# Patient Record
Sex: MALE | Race: WHITE | Employment: UNEMPLOYED | URBAN - METROPOLITAN AREA
[De-identification: names, ages, dates, MRNs, and addresses within clinical notes are randomized per-mention and may not be internally consistent; named-entity substitution may affect disease eponyms.]

---

## 2017-09-11 ENCOUNTER — APPOINTMENT (OUTPATIENT)
Dept: CT IMAGING | Age: 22
End: 2017-09-11
Attending: EMERGENCY MEDICINE
Payer: SELF-PAY

## 2017-09-11 ENCOUNTER — HOSPITAL ENCOUNTER (EMERGENCY)
Age: 22
Discharge: HOME | End: 2017-09-11
Attending: EMERGENCY MEDICINE
Payer: SELF-PAY

## 2017-09-11 VITALS
OXYGEN SATURATION: 98 % | TEMPERATURE: 98 F | DIASTOLIC BLOOD PRESSURE: 78 MMHG | HEART RATE: 84 BPM | BODY MASS INDEX: 26.48 KG/M2 | RESPIRATION RATE: 16 BRPM | HEIGHT: 70 IN | WEIGHT: 185 LBS | SYSTOLIC BLOOD PRESSURE: 145 MMHG

## 2017-09-11 DIAGNOSIS — R10.31 RLQ ABDOMINAL PAIN: Primary | ICD-10-CM

## 2017-09-11 LAB
ALBUMIN SERPL-MCNC: 4.1 G/DL (ref 3.5–5)
ALBUMIN/GLOB SERPL: 1.2 {RATIO} (ref 1.1–2.2)
ALP SERPL-CCNC: 82 U/L (ref 45–117)
ALT SERPL-CCNC: 33 U/L (ref 12–78)
ANION GAP SERPL CALC-SCNC: 8 MMOL/L (ref 5–15)
AST SERPL-CCNC: 30 U/L (ref 15–37)
BASOPHILS # BLD: 0 K/UL (ref 0–0.1)
BASOPHILS NFR BLD: 0 % (ref 0–1)
BILIRUB SERPL-MCNC: 1.1 MG/DL (ref 0.2–1)
BUN SERPL-MCNC: 12 MG/DL (ref 6–20)
BUN/CREAT SERPL: 12 (ref 12–20)
CALCIUM SERPL-MCNC: 8.6 MG/DL (ref 8.5–10.1)
CHLORIDE SERPL-SCNC: 105 MMOL/L (ref 97–108)
CO2 SERPL-SCNC: 27 MMOL/L (ref 21–32)
CREAT SERPL-MCNC: 1.02 MG/DL (ref 0.7–1.3)
EOSINOPHIL # BLD: 0.3 K/UL (ref 0–0.4)
EOSINOPHIL NFR BLD: 3 % (ref 0–7)
ERYTHROCYTE [DISTWIDTH] IN BLOOD BY AUTOMATED COUNT: 12.2 % (ref 11.5–14.5)
GLOBULIN SER CALC-MCNC: 3.3 G/DL (ref 2–4)
GLUCOSE SERPL-MCNC: 95 MG/DL (ref 65–100)
HCT VFR BLD AUTO: 44.9 % (ref 36.6–50.3)
HGB BLD-MCNC: 16.3 G/DL (ref 12.1–17)
LYMPHOCYTES # BLD: 2.8 K/UL (ref 0.8–3.5)
LYMPHOCYTES NFR BLD: 32 % (ref 12–49)
MCH RBC QN AUTO: 32.8 PG (ref 26–34)
MCHC RBC AUTO-ENTMCNC: 36.3 G/DL (ref 30–36.5)
MCV RBC AUTO: 90.3 FL (ref 80–99)
MONOCYTES # BLD: 0.8 K/UL (ref 0–1)
MONOCYTES NFR BLD: 9 % (ref 5–13)
NEUTS SEG # BLD: 4.8 K/UL (ref 1.8–8)
NEUTS SEG NFR BLD: 56 % (ref 32–75)
PLATELET # BLD AUTO: 171 K/UL (ref 150–400)
POTASSIUM SERPL-SCNC: 3.6 MMOL/L (ref 3.5–5.1)
PROT SERPL-MCNC: 7.4 G/DL (ref 6.4–8.2)
RBC # BLD AUTO: 4.97 M/UL (ref 4.1–5.7)
SODIUM SERPL-SCNC: 140 MMOL/L (ref 136–145)
WBC # BLD AUTO: 8.7 K/UL (ref 4.1–11.1)

## 2017-09-11 PROCEDURE — 74011250637 HC RX REV CODE- 250/637: Performed by: EMERGENCY MEDICINE

## 2017-09-11 PROCEDURE — 80053 COMPREHEN METABOLIC PANEL: CPT | Performed by: EMERGENCY MEDICINE

## 2017-09-11 PROCEDURE — 85025 COMPLETE CBC W/AUTO DIFF WBC: CPT | Performed by: EMERGENCY MEDICINE

## 2017-09-11 PROCEDURE — 36415 COLL VENOUS BLD VENIPUNCTURE: CPT | Performed by: EMERGENCY MEDICINE

## 2017-09-11 PROCEDURE — 99283 EMERGENCY DEPT VISIT LOW MDM: CPT

## 2017-09-11 PROCEDURE — 74177 CT ABD & PELVIS W/CONTRAST: CPT

## 2017-09-11 PROCEDURE — 96374 THER/PROPH/DIAG INJ IV PUSH: CPT

## 2017-09-11 PROCEDURE — 96361 HYDRATE IV INFUSION ADD-ON: CPT

## 2017-09-11 PROCEDURE — 74011250636 HC RX REV CODE- 250/636: Performed by: EMERGENCY MEDICINE

## 2017-09-11 PROCEDURE — 74011636320 HC RX REV CODE- 636/320: Performed by: EMERGENCY MEDICINE

## 2017-09-11 PROCEDURE — 74011000258 HC RX REV CODE- 258: Performed by: EMERGENCY MEDICINE

## 2017-09-11 RX ORDER — MORPHINE SULFATE 2 MG/ML
6 INJECTION, SOLUTION INTRAMUSCULAR; INTRAVENOUS
Status: COMPLETED | OUTPATIENT
Start: 2017-09-11 | End: 2017-09-11

## 2017-09-11 RX ORDER — SODIUM CHLORIDE 0.9 % (FLUSH) 0.9 %
10 SYRINGE (ML) INJECTION
Status: COMPLETED | OUTPATIENT
Start: 2017-09-11 | End: 2017-09-11

## 2017-09-11 RX ORDER — OXYCODONE AND ACETAMINOPHEN 5; 325 MG/1; MG/1
1 TABLET ORAL
Status: COMPLETED | OUTPATIENT
Start: 2017-09-11 | End: 2017-09-11

## 2017-09-11 RX ORDER — OXYCODONE AND ACETAMINOPHEN 5; 325 MG/1; MG/1
1 TABLET ORAL
Qty: 15 TAB | Refills: 0 | Status: SHIPPED | OUTPATIENT
Start: 2017-09-11

## 2017-09-11 RX ADMIN — OXYCODONE HYDROCHLORIDE AND ACETAMINOPHEN 1 TABLET: 5; 325 TABLET ORAL at 23:03

## 2017-09-11 RX ADMIN — SODIUM CHLORIDE 100 ML: 900 INJECTION, SOLUTION INTRAVENOUS at 21:04

## 2017-09-11 RX ADMIN — Medication 10 ML: at 21:04

## 2017-09-11 RX ADMIN — SODIUM CHLORIDE 1000 ML: 900 INJECTION, SOLUTION INTRAVENOUS at 20:40

## 2017-09-11 RX ADMIN — MORPHINE SULFATE 6 MG: 2 INJECTION, SOLUTION INTRAMUSCULAR; INTRAVENOUS at 20:41

## 2017-09-11 RX ADMIN — IOPAMIDOL 100 ML: 755 INJECTION, SOLUTION INTRAVENOUS at 21:04

## 2017-09-11 NOTE — ED TRIAGE NOTES
1 week ago started to have pain when he was coughing. The pain got worse and two days ago he went to [facility]. He was told they didn't find anything with the scan but was called yesterday and told he has a \"tear\"in his abdomen and if his pain got worse he should be seen again.

## 2017-09-12 NOTE — DISCHARGE INSTRUCTIONS

## 2017-09-12 NOTE — ED PROVIDER NOTES
HPI Comments: 19 y.o. male with no significant past medical history who presents from home with chief complaint of abd pain. Per pt, he experienced onset of pain to his RLQ while he was coughing. He reports that the pain appeared to be a stabbing like pain which woke him from his sleep at the time. Since onset, the pt notes that his RLQ pain has increasingly worsened. Per pt, he was seen at [facility] two days ago (9/9/2017) where he received a CT scan and informed that there were no significant findings. The pt reports that his abd pain has worsened over the past two days and he has not been taking any pain medication to treat it. He rates his current pain 10/10. Per pt, he received a call yesterday (9/11/2017) regarding his CT scan from two days ago and was instructed to be seen in the ED if his pain worsened as there was evidence of a \"tear\" to his abd. The pt denies fever, chills, N/V/D, CP, SOB, dizziness, headache and urinary symptoms. There are no other acute medical concerns at this time. Social hx: Current daily smoker, No ETOH use  PCP: None    Note written by Lynne Hines, as dictated by Briseida Lara MD 8:19 PM          The history is provided by the patient. History reviewed. No pertinent past medical history. History reviewed. No pertinent surgical history. No family history on file. Social History     Social History    Marital status: SINGLE     Spouse name: N/A    Number of children: N/A    Years of education: N/A     Occupational History    Not on file. Social History Main Topics    Smoking status: Current Every Day Smoker    Smokeless tobacco: Not on file    Alcohol use No    Drug use: Not on file    Sexual activity: Not on file     Other Topics Concern    Not on file     Social History Narrative    No narrative on file         ALLERGIES: Penicillins    Review of Systems   Constitutional: Negative. Negative for chills and fever. HENT: Negative. Negative for rhinorrhea and sore throat. Eyes: Negative. Respiratory: Positive for cough (intermittent cough for the past week). Negative for shortness of breath. Cardiovascular: Negative. Negative for chest pain. Gastrointestinal: Positive for abdominal pain ( increasing RLQ pain since onset 1x week ago. Pain worse with movement or when coughing. ). Negative for diarrhea, nausea and vomiting. Endocrine: Negative. Genitourinary: Negative. Negative for dysuria and hematuria. Musculoskeletal: Negative. Negative for arthralgias and myalgias. Skin: Negative. Negative for pallor and rash. Allergic/Immunologic: Negative. Neurological: Negative. Negative for dizziness, weakness and light-headedness. Hematological: Negative. Psychiatric/Behavioral: Negative. All other systems reviewed and are negative. Vitals:    09/11/17 1943   BP: 151/84   Pulse: 94   Resp: 20   Temp: 97.8 °F (36.6 °C)   SpO2: 97%   Weight: 83.9 kg (185 lb)   Height: 5' 10\" (1.778 m)            Physical Exam   Vital signs reviewed. Nursing notes reviewed.     Const:  No acute distress, well developed, well nourished  Head:  Atraumatic, normocephalic  Eyes:  PERRL, conjunctiva normal, no scleral icterus  Neck:  Supple, trachea midline  Cardiovascular:  RRR, no murmurs, no gallops, no rubs  Resp:  No resp distress, no increased work of breathing, no wheezes, no rhonchi, no rales,  Abd:  Soft, Moderate tenderness to RLQ with palpation, non-distended, no rebound, no guarding, no CVA tenderness  :  Deferred  MSK:  No pedal edema, normal ROM  Neuro:  Alert and oriented x3, no cranial nerve defect  Skin:  Warm, dry, intact  Psych: normal mood and affect, behavior is normal, judgement and thought content is normal    Note written by Lynne Hines, as dictated by Briseida Lara MD 8:19 PM    MDM  Number of Diagnoses or Management Options  RLQ abdominal pain:      Amount and/or Complexity of Data Reviewed  Clinical lab tests: ordered and reviewed  Tests in the radiology section of CPT®: ordered and reviewed  Review and summarize past medical records: yes    Patient Progress  Patient progress: stable    ED Course     Pt. Presents to the ER with abdominal pain. Pt. With RLQ abd pain. He was told that he had a \"tear\" on his CT. No acute findings on CT. No appendicitis. I will start him on percocet as needed for pain. Pt. To f/u with his PCP or return to the ER with worsening sx.       Procedures

## 2022-06-14 ENCOUNTER — HOSPITAL ENCOUNTER (EMERGENCY)
Age: 27
Discharge: HOME | End: 2022-06-14
Attending: EMERGENCY MEDICINE

## 2022-06-14 VITALS
WEIGHT: 214.07 LBS | TEMPERATURE: 98.1 F | BODY MASS INDEX: 30.65 KG/M2 | HEIGHT: 70 IN | HEART RATE: 92 BPM | OXYGEN SATURATION: 98 % | RESPIRATION RATE: 17 BRPM

## 2022-06-14 DIAGNOSIS — M54.50 ACUTE MIDLINE LOW BACK PAIN WITHOUT SCIATICA: Primary | ICD-10-CM

## 2022-06-14 PROCEDURE — 74011250636 HC RX REV CODE- 250/636: Performed by: EMERGENCY MEDICINE

## 2022-06-14 PROCEDURE — 99284 EMERGENCY DEPT VISIT MOD MDM: CPT

## 2022-06-14 PROCEDURE — 96372 THER/PROPH/DIAG INJ SC/IM: CPT

## 2022-06-14 PROCEDURE — 74011250637 HC RX REV CODE- 250/637: Performed by: EMERGENCY MEDICINE

## 2022-06-14 RX ORDER — NAPROXEN 500 MG/1
500 TABLET ORAL 2 TIMES DAILY WITH MEALS
Qty: 20 TABLET | Refills: 0 | Status: SHIPPED | OUTPATIENT
Start: 2022-06-14 | End: 2022-06-24

## 2022-06-14 RX ORDER — LIDOCAINE 50 MG/G
PATCH TOPICAL
Qty: 15 EACH | Refills: 0 | Status: SHIPPED | OUTPATIENT
Start: 2022-06-14

## 2022-06-14 RX ORDER — DIAZEPAM 5 MG/1
5 TABLET ORAL
Status: COMPLETED | OUTPATIENT
Start: 2022-06-15 | End: 2022-06-14

## 2022-06-14 RX ORDER — KETOROLAC TROMETHAMINE 30 MG/ML
60 INJECTION, SOLUTION INTRAMUSCULAR; INTRAVENOUS ONCE
Status: COMPLETED | OUTPATIENT
Start: 2022-06-15 | End: 2022-06-14

## 2022-06-14 RX ORDER — CYCLOBENZAPRINE HCL 10 MG
10 TABLET ORAL
Qty: 12 TABLET | Refills: 0 | Status: SHIPPED | OUTPATIENT
Start: 2022-06-14

## 2022-06-14 RX ADMIN — DIAZEPAM 5 MG: 5 TABLET ORAL at 23:23

## 2022-06-14 RX ADMIN — KETOROLAC TROMETHAMINE 60 MG: 30 INJECTION, SOLUTION INTRAMUSCULAR; INTRAVENOUS at 23:23

## 2022-06-14 NOTE — Clinical Note
Clare Craft was seen and treated in our emergency department on 6/14/2022. Please provide assistance at work until back pain improves.     Angelica Lopez MD

## 2022-06-15 NOTE — ED TRIAGE NOTES
Pt reports twisting his back while cleaning a reptile tank yesterday, now reporting lower back pain. Pt denies loss of bladder or bowel control, denies numbness or tingling in arms or legs. Taking tylenol and ibuprofen without improvement.

## 2022-06-15 NOTE — ED PROVIDER NOTES
Healthy. He presents with complaints of a 1 day history of low back pain that does not radiate. It is moderate in intensity and worse with movement. He states that he twisted his back while cleaning a reptile tank. The pain is worse in the midline. No lower extremity numbness, tingling, weakness. No bowel or bladder incontinence. He tried over-the-counter medications without relief. He denies any history of back pain. History reviewed. No pertinent past medical history. History reviewed. No pertinent surgical history. History reviewed. No pertinent family history. Social History     Socioeconomic History    Marital status: SINGLE     Spouse name: Not on file    Number of children: Not on file    Years of education: Not on file    Highest education level: Not on file   Occupational History    Not on file   Tobacco Use    Smoking status: Current Every Day Smoker    Smokeless tobacco: Never Used   Substance and Sexual Activity    Alcohol use: Yes     Comment: occasional    Drug use: Yes     Types: Marijuana    Sexual activity: Not on file   Other Topics Concern    Not on file   Social History Narrative    Not on file     Social Determinants of Health     Financial Resource Strain:     Difficulty of Paying Living Expenses: Not on file   Food Insecurity:     Worried About Running Out of Food in the Last Year: Not on file    Shawna of Food in the Last Year: Not on file   Transportation Needs:     Lack of Transportation (Medical): Not on file    Lack of Transportation (Non-Medical):  Not on file   Physical Activity:     Days of Exercise per Week: Not on file    Minutes of Exercise per Session: Not on file   Stress:     Feeling of Stress : Not on file   Social Connections:     Frequency of Communication with Friends and Family: Not on file    Frequency of Social Gatherings with Friends and Family: Not on file    Attends Religion Services: Not on file   [employer] of Clubs or Organizations: Not on file    Attends Club or Organization Meetings: Not on file    Marital Status: Not on file   Intimate Partner Violence:     Fear of Current or Ex-Partner: Not on file    Emotionally Abused: Not on file    Physically Abused: Not on file    Sexually Abused: Not on file   Housing Stability:     Unable to Pay for Housing in the Last Year: Not on file    Number of [place] in the Last Year: Not on file    Unstable Housing in the Last Year: Not on file         ALLERGIES: Penicillins    Review of Systems   All other systems reviewed and are negative. Vitals:    06/14/22 2148   Pulse: 92   Resp: 17   Temp: 98.1 °F (36.7 °C)   SpO2: 98%   Weight: 97.1 kg (214 lb 1.1 oz)   Height: 5' 10\" (1.778 m)            Physical Exam  Vitals and nursing note reviewed. Constitutional:       Appearance: He is well-developed. HENT:      Head: Normocephalic and atraumatic. Eyes:      Conjunctiva/sclera: Conjunctivae normal.   Neck:      Trachea: No tracheal deviation. Cardiovascular:      Rate and Rhythm: Normal rate. Pulmonary:      Effort: Pulmonary effort is normal.   Abdominal:      General: There is no distension. Genitourinary:     Comments: Point tender upper lumbar region in the midline. Reproducible pain with movement. Normal lower extremity strength and sensation. Skin:     General: Skin is dry. Neurological:      Mental Status: He is alert. MDM       Procedures    Assessment/plan: Low back pain. Consistent with muscle spasm. Reassuring appearance/exam with stable vital signs. Home with Flexeril, Lidoderm patches, Naprosyn. PCP follow-up. Return precautions discussed.   Antonio Paige MD

## 2023-02-06 ENCOUNTER — HOSPITAL ENCOUNTER (EMERGENCY)
Age: 28
Discharge: HOME | End: 2023-02-06
Attending: STUDENT IN AN ORGANIZED HEALTH CARE EDUCATION/TRAINING PROGRAM

## 2023-02-06 ENCOUNTER — APPOINTMENT (OUTPATIENT)
Dept: CT IMAGING | Age: 28
End: 2023-02-06

## 2023-02-06 VITALS
HEART RATE: 72 BPM | RESPIRATION RATE: 16 BRPM | TEMPERATURE: 98 F | WEIGHT: 220 LBS | DIASTOLIC BLOOD PRESSURE: 75 MMHG | BODY MASS INDEX: 31.5 KG/M2 | OXYGEN SATURATION: 100 % | HEIGHT: 70 IN | SYSTOLIC BLOOD PRESSURE: 132 MMHG

## 2023-02-06 DIAGNOSIS — J01.90 ACUTE SINUSITIS, RECURRENCE NOT SPECIFIED, UNSPECIFIED LOCATION: ICD-10-CM

## 2023-02-06 DIAGNOSIS — J03.90 ACUTE TONSILLITIS, UNSPECIFIED ETIOLOGY: Primary | ICD-10-CM

## 2023-02-06 DIAGNOSIS — J02.9 ACUTE PHARYNGITIS, UNSPECIFIED ETIOLOGY: ICD-10-CM

## 2023-02-06 LAB
ALBUMIN SERPL-MCNC: 3.8 G/DL (ref 3.5–5)
ALBUMIN/GLOB SERPL: 1.2 (ref 1.1–2.2)
ALP SERPL-CCNC: 67 U/L (ref 45–117)
ALT SERPL-CCNC: 47 U/L (ref 12–78)
ANION GAP SERPL CALC-SCNC: 8 MMOL/L (ref 5–15)
AST SERPL-CCNC: 22 U/L (ref 15–37)
BASOPHILS # BLD: 0.1 K/UL (ref 0–0.1)
BASOPHILS NFR BLD: 1 % (ref 0–1)
BILIRUB SERPL-MCNC: 0.9 MG/DL (ref 0.2–1)
BUN SERPL-MCNC: 17 MG/DL (ref 6–20)
BUN/CREAT SERPL: 18 (ref 12–20)
CALCIUM SERPL-MCNC: 8.6 MG/DL (ref 8.5–10.1)
CHLORIDE SERPL-SCNC: 104 MMOL/L (ref 97–108)
CO2 SERPL-SCNC: 27 MMOL/L (ref 21–32)
CREAT SERPL-MCNC: 0.94 MG/DL (ref 0.7–1.3)
DEPRECATED S PYO AG THROAT QL EIA: NEGATIVE
DIFFERENTIAL METHOD BLD: ABNORMAL
EOSINOPHIL # BLD: 0.4 K/UL (ref 0–0.4)
EOSINOPHIL NFR BLD: 5 % (ref 0–7)
ERYTHROCYTE [DISTWIDTH] IN BLOOD BY AUTOMATED COUNT: 11.4 % (ref 11.5–14.5)
FLUAV AG NPH QL IA: NEGATIVE
FLUBV AG NOSE QL IA: NEGATIVE
GLOBULIN SER CALC-MCNC: 3.1 G/DL (ref 2–4)
GLUCOSE SERPL-MCNC: 104 MG/DL (ref 65–100)
HCT VFR BLD AUTO: 43 % (ref 36.6–50.3)
HGB BLD-MCNC: 15 G/DL (ref 12.1–17)
IMM GRANULOCYTES # BLD AUTO: 0 K/UL (ref 0–0.04)
IMM GRANULOCYTES NFR BLD AUTO: 0 % (ref 0–0.5)
LYMPHOCYTES # BLD: 2.1 K/UL (ref 0.8–3.5)
LYMPHOCYTES NFR BLD: 24 % (ref 12–49)
MCH RBC QN AUTO: 30.5 PG (ref 26–34)
MCHC RBC AUTO-ENTMCNC: 34.9 G/DL (ref 30–36.5)
MCV RBC AUTO: 87.6 FL (ref 80–99)
MONOCYTES # BLD: 0.5 K/UL (ref 0–1)
MONOCYTES NFR BLD: 6 % (ref 5–13)
NEUTS SEG # BLD: 5.5 K/UL (ref 1.8–8)
NEUTS SEG NFR BLD: 64 % (ref 32–75)
NRBC # BLD: 0 K/UL (ref 0–0.01)
NRBC BLD-RTO: 0 PER 100 WBC
PLATELET # BLD AUTO: 178 K/UL (ref 150–400)
PMV BLD AUTO: 11.9 FL (ref 8.9–12.9)
POTASSIUM SERPL-SCNC: 4.2 MMOL/L (ref 3.5–5.1)
PROT SERPL-MCNC: 6.9 G/DL (ref 6.4–8.2)
RBC # BLD AUTO: 4.91 M/UL (ref 4.1–5.7)
SARS-COV-2 RDRP RESP QL NAA+PROBE: NOT DETECTED
SODIUM SERPL-SCNC: 139 MMOL/L (ref 136–145)
SOURCE, COVRS: NORMAL
WBC # BLD AUTO: 8.6 K/UL (ref 4.1–11.1)

## 2023-02-06 PROCEDURE — 74011250636 HC RX REV CODE- 250/636

## 2023-02-06 PROCEDURE — 74011250637 HC RX REV CODE- 250/637

## 2023-02-06 PROCEDURE — 87880 STREP A ASSAY W/OPTIC: CPT

## 2023-02-06 PROCEDURE — 99285 EMERGENCY DEPT VISIT HI MDM: CPT

## 2023-02-06 PROCEDURE — 96372 THER/PROPH/DIAG INJ SC/IM: CPT

## 2023-02-06 PROCEDURE — 80053 COMPREHEN METABOLIC PANEL: CPT

## 2023-02-06 PROCEDURE — 87070 CULTURE OTHR SPECIMN AEROBIC: CPT

## 2023-02-06 PROCEDURE — 87635 SARS-COV-2 COVID-19 AMP PRB: CPT

## 2023-02-06 PROCEDURE — 36415 COLL VENOUS BLD VENIPUNCTURE: CPT

## 2023-02-06 PROCEDURE — 87804 INFLUENZA ASSAY W/OPTIC: CPT

## 2023-02-06 PROCEDURE — 74011000636 HC RX REV CODE- 636: Performed by: STUDENT IN AN ORGANIZED HEALTH CARE EDUCATION/TRAINING PROGRAM

## 2023-02-06 PROCEDURE — 70491 CT SOFT TISSUE NECK W/DYE: CPT

## 2023-02-06 PROCEDURE — 85025 COMPLETE CBC W/AUTO DIFF WBC: CPT

## 2023-02-06 RX ORDER — CLINDAMYCIN HYDROCHLORIDE 300 MG/1
300 CAPSULE ORAL 3 TIMES DAILY
Qty: 30 CAPSULE | Refills: 0 | Status: SHIPPED | OUTPATIENT
Start: 2023-02-06 | End: 2023-02-09

## 2023-02-06 RX ORDER — KETOROLAC TROMETHAMINE 30 MG/ML
30 INJECTION, SOLUTION INTRAMUSCULAR; INTRAVENOUS
Status: COMPLETED | OUTPATIENT
Start: 2023-02-06 | End: 2023-02-06

## 2023-02-06 RX ORDER — DEXAMETHASONE SODIUM PHOSPHATE 10 MG/ML
10 INJECTION INTRAMUSCULAR; INTRAVENOUS ONCE
Status: COMPLETED | OUTPATIENT
Start: 2023-02-06 | End: 2023-02-06

## 2023-02-06 RX ADMIN — SODIUM CHLORIDE 1000 ML: 9 INJECTION, SOLUTION INTRAVENOUS at 14:51

## 2023-02-06 RX ADMIN — KETOROLAC TROMETHAMINE 30 MG: 30 INJECTION, SOLUTION INTRAMUSCULAR; INTRAVENOUS at 13:17

## 2023-02-06 RX ADMIN — DEXAMETHASONE SODIUM PHOSPHATE 10 MG: 10 INJECTION INTRAMUSCULAR; INTRAVENOUS at 13:16

## 2023-02-06 RX ADMIN — IOPAMIDOL 100 ML: 755 INJECTION, SOLUTION INTRAVENOUS at 15:33

## 2023-02-06 NOTE — Clinical Note
[facility]  [address]  [address]  [phone]    Work/School Note    Date: 2/6/2023    To Whom It May concern:    Shira Ron was seen and treated today in the emergency room by the following provider(s):  Attending Provider: Aditi Melton DO  Nurse Practitioner: Tiara Tolbert. Shira Ron is excused from work/school on 02/06/23 and 02/07/23. He is medically clear to return to work/school on 2/8/2023.        Sincerely,          Marion Hernandez

## 2023-02-06 NOTE — ED PROVIDER NOTES
Jake Blankenship is a 32 y.o. male presenting to the ED c/o sore throat X 2 weeks that worsen in the morning. Pt denies taking any medications to help with throat pain. Pt denies recent sick contacts. Pt reports swelling in the left side of neck and noted blood in back of throat this morning. Pt reports decreased fluid intake due to pain in throat.t reports having chronic cough from smoking but will have intermittent episodes of coughing. Denies sob, fever, chills, chest pain, nausea, vomiting, rhinorrhea, nasal congestion. Medical hx: denies  Surgical hx: denies        The history is provided by the patient. No  was used. Sore Throat   Associated symptoms include cough. Pertinent negatives include no diarrhea, no vomiting, no congestion, no drooling and no shortness of breath. History reviewed. No pertinent past medical history. History reviewed. No pertinent surgical history. History reviewed. No pertinent family history.     Social History     Socioeconomic History    Marital status: SINGLE     Spouse name: Not on file    Number of children: Not on file    Years of education: Not on file    Highest education level: Not on file   Occupational History    Not on file   Tobacco Use    Smoking status: Every Day     Packs/day: 1.00     Types: Cigarettes    Smokeless tobacco: Never   Substance and Sexual Activity    Alcohol use: Yes     Comment: occasional    Drug use: Yes     Types: Marijuana    Sexual activity: Not on file   Other Topics Concern    Not on file   Social History Narrative    Not on file     Social Determinants of Health     Financial Resource Strain: Not on file   Food Insecurity: Not on file   Transportation Needs: Not on file   Physical Activity: Not on file   Stress: Not on file   Social Connections: Not on file   Intimate Partner Violence: Not on file   Housing Stability: Not on file         ALLERGIES: Penicillins    Review of Systems   Constitutional: Negative for activity change, appetite change, chills and fever. HENT:  Positive for sore throat and voice change. Negative for congestion, dental problem, drooling, facial swelling and rhinorrhea. Pain w/ swallowing   Eyes:  Negative for visual disturbance. Respiratory:  Positive for cough. Negative for shortness of breath. Cardiovascular:  Negative for chest pain. Gastrointestinal:  Negative for abdominal pain, diarrhea, nausea and vomiting. Genitourinary:  Negative for decreased urine volume and difficulty urinating. Musculoskeletal:  Negative for neck pain and neck stiffness. Skin:  Negative for rash. Neurological:  Negative for speech difficulty. All other systems reviewed and are negative. Vitals:    02/06/23 1047   BP: (!) 137/91   Pulse: 71   Resp: 16   Temp: 98 °F (36.7 °C)   SpO2: 99%   Weight: 99.8 kg (220 lb)   Height: 5' 10\" (1.778 m)            Physical Exam  Vitals reviewed. Constitutional:       General: He is not in acute distress. Appearance: Normal appearance. He is normal weight. HENT:      Head: Normocephalic. Nose: No congestion or rhinorrhea. Mouth/Throat:      Mouth: Mucous membranes are moist.      Pharynx: Uvula midline. Posterior oropharyngeal erythema present. No oropharyngeal exudate. Eyes:      Extraocular Movements: Extraocular movements intact. Conjunctiva/sclera: Conjunctivae normal.      Pupils: Pupils are equal, round, and reactive to light. Neck:     Cardiovascular:      Rate and Rhythm: Normal rate and regular rhythm. Pulmonary:      Effort: Pulmonary effort is normal.      Breath sounds: Normal breath sounds. Abdominal:      General: Abdomen is flat. Bowel sounds are normal.      Palpations: Abdomen is soft. Musculoskeletal:         General: Normal range of motion. Cervical back: Normal range of motion and neck supple. Skin:     General: Skin is warm and dry.       Capillary Refill: Capillary refill takes less than 2 seconds. Neurological:      Mental Status: He is alert and oriented to person, place, and time. Mental status is at baseline. Cranial Nerves: No cranial nerve deficit. Gait: Gait normal.        Medical Decision Making  Amount and/or Complexity of Data Reviewed  Labs: ordered. Radiology: ordered. Risk  Prescription drug management. Pt is a 33 y/o male presenting to the ED c/o sore throat X 2 weeks. Doubt peritonsillar abscess given CT showed \"no evidence of peritonsillar abscess\" and \"no significant narrowing of the airway\". Doubt strep, influenza, and COVID-19 given swabs were negative. Doubt pneumonia given no adventitious sounds were heard in the lungs. CT did show \"mild pharyngitis and tonsillitis\" and \"suspicious for acute sinusitis\". Pt reports he is severely allergic to penicillin, so pt will be prescribed clindamycin to help with infection. Pt is stable for discharge home. Pt encouraged to follow-up with a primary care provider in 2 days. Pt reports he does not have insurance or PCP. A list of local PCP providers given to pt for follow-up. Strict return to ED precautions given. ACI discussed with the patient; see instruction below. Patient verbalized understanding.       Procedures    N/A    LABORATORY TESTS:  Recent Results (from the past 12 hour(s))   COVID-19 RAPID TEST    Collection Time: 02/06/23 10:50 AM   Result Value Ref Range    Specimen source Nasopharyngeal      COVID-19 rapid test Not detected NOTD     INFLUENZA A+B VIRAL AGS    Collection Time: 02/06/23 10:50 AM   Result Value Ref Range    Influenza A Antigen Negative NEG      Influenza B Antigen Negative NEG     STREP AG SCREEN, GROUP A    Collection Time: 02/06/23 10:50 AM    Specimen: Swab; Throat   Result Value Ref Range    Group A Strep Ag ID Negative NEG     CBC WITH AUTOMATED DIFF    Collection Time: 02/06/23  2:47 PM   Result Value Ref Range    WBC 8.6 4.1 - 11.1 K/uL    RBC 4.91 4.10 - 5.70 M/uL    HGB 15.0 12.1 - 17.0 g/dL    HCT 43.0 36.6 - 50.3 %    MCV 87.6 80.0 - 99.0 FL    MCH 30.5 26.0 - 34.0 PG    MCHC 34.9 30.0 - 36.5 g/dL    RDW 11.4 (L) 11.5 - 14.5 %    PLATELET 346 209 - 054 K/uL    MPV 11.9 8.9 - 12.9 FL    NRBC 0.0 0  WBC    ABSOLUTE NRBC 0.00 0.00 - 0.01 K/uL    NEUTROPHILS 64 32 - 75 %    LYMPHOCYTES 24 12 - 49 %    MONOCYTES 6 5 - 13 %    EOSINOPHILS 5 0 - 7 %    BASOPHILS 1 0 - 1 %    IMMATURE GRANULOCYTES 0 0.0 - 0.5 %    ABS. NEUTROPHILS 5.5 1.8 - 8.0 K/UL    ABS. LYMPHOCYTES 2.1 0.8 - 3.5 K/UL    ABS. MONOCYTES 0.5 0.0 - 1.0 K/UL    ABS. EOSINOPHILS 0.4 0.0 - 0.4 K/UL    ABS. BASOPHILS 0.1 0.0 - 0.1 K/UL    ABS. IMM. GRANS. 0.0 0.00 - 0.04 K/UL    DF AUTOMATED     METABOLIC PANEL, COMPREHENSIVE    Collection Time: 02/06/23  2:47 PM   Result Value Ref Range    Sodium 139 136 - 145 mmol/L    Potassium 4.2 3.5 - 5.1 mmol/L    Chloride 104 97 - 108 mmol/L    CO2 27 21 - 32 mmol/L    Anion gap 8 5 - 15 mmol/L    Glucose 104 (H) 65 - 100 mg/dL    BUN 17 6 - 20 MG/DL    Creatinine 0.94 0.70 - 1.30 MG/DL    BUN/Creatinine ratio 18 12 - 20      eGFR >60 >60 ml/min/1.73m2    Calcium 8.6 8.5 - 10.1 MG/DL    Bilirubin, total 0.9 0.2 - 1.0 MG/DL    ALT (SGPT) 47 12 - 78 U/L    AST (SGOT) 22 15 - 37 U/L    Alk. phosphatase 67 45 - 117 U/L    Protein, total 6.9 6.4 - 8.2 g/dL    Albumin 3.8 3.5 - 5.0 g/dL    Globulin 3.1 2.0 - 4.0 g/dL    A-G Ratio 1.2 1.1 - 2.2         IMAGING RESULTS:  CT NECK SOFT TISSUE W CONT   Final Result   1. Findings consistent with mild pharyngitis and tonsillitis, with no evidence   of a peritonsillar abscess. No significant narrowing of the airway. 2. Bilateral maxillary sinus mucosal thickening, left worse than right, with   frothy secretions suspicious for acute sinusitis.              MEDICATIONS GIVEN:  Medications   dexamethasone (PF) (DECADRON) 10 mg/mL Oral 10 mg (10 mg Oral Given 2/6/23 1316)   ketorolac (TORADOL) injection 30 mg (30 mg IntraMUSCular Given 2/6/23 1317) iopamidoL (ISOVUE-370) 370 mg iodine /mL (76 %) injection 100 mL (100 mL IntraVENous Given 2/6/23 1533)   sodium chloride 0.9 % bolus infusion 1,000 mL (0 mL IntraVENous IV Completed 2/6/23 1708)       IMPRESSION:  1. Acute tonsillitis, unspecified etiology    2. Acute pharyngitis, unspecified etiology    3. Acute sinusitis, recurrence not specified, unspecified location        PLAN:  1. Take clindamycin as instructed only and complete full course. Discharge Medication List as of 2/6/2023  4:55 PM        2. Take ibuprofen and tylenol as needed for pain/ fever. 3. Establish pcp and follow-up in 2 days. Follow-up Information       Follow up With Specialties Details Why Contact Info    PCP  In 2 days            3.  Return to ED if worse     Signed By: Dennie Repress, ARNP     February 6, 2023

## 2023-02-06 NOTE — ED TRIAGE NOTES
Pt reports sore throat for the past 2 weeks that is worse this am. Pt reports he is a smoker and he coughs every morning when he wakes up but this am he noticed he coughed up a little blood and the back of his throat was really red.

## 2023-02-06 NOTE — DISCHARGE INSTRUCTIONS
Take ibuprofen 600 mg every 6 hours as needed for pain. Make sure to take medication with food. You may also take tylenol in between the ibuprofen to help with pain. Take the clindamycin as instructed only and to complete full course of the antibiotics. Stop smoking! Follow-up with a primary care provider to see how your symptoms are and establish long-term care. Here is a list of local primary care providers to follow-up:     [facility] Departments     For adult and child immunizations, family planning, TB screening, STD testing and women's health services. [facility]: [place] [phone]      [facility] 25   [address]   [address]   [address] [facility]: [facility] [address] [facility] [address] [phone]      [address]00 [facility]          [address] [place] [address]     For primary care services, woman and child wellness, and some clinics providing specialty care. [facility] -- [facility]. [address] [place] [address] [phone]   [address] [place] [address] 200 [facility] [address] [place], [place] [phone]   [address] [place] [address] [address]  [phone] [address] [facility] [address] [place] [address] 5850  Community  [phone]   [address] [place] [address] [phone]   [facility] 81 [facility] [phone]   [address] [phone]   [facility]: [facility] [address] [facility] [address] [phone]     [address] [phone]   [facility] Outreach 5850  Community  [phone]   [facility]  [address], [place] [address] ([email or web address]) [phone]         Sexual Health/Woman Wellness Clinics    For STD/HIV testing and treatment, pregnancy testing and services, men's health, birth control services, LGBT services, and hepatitis/HPV vaccine services. [facility] [employer] [address] [facility] [address] [facility] [address] [facility] 1579 [address] [phone]   [facility] [address], 5th floor [phone]   Pregnancy [address] [facility] [address] [phone]         [place] [address] [facility] [address] [place] [address]   [phone]   [place]   [phone]   Women, Infant and Children's Services: [address] [phone]       600 [facility] Crisis Intervention   [phone]   [address] [facility] Psychiatry     [phone]   [address] Crisis   1212 [facility] [phone]       Local Primary Care Physicians  [facility] [phone]  MD Sergo Choe MD Blaise Erie, MD [facility] Community Doctors [phone]  Irene Carson, [facility]  Wanda Heimlich, MD Denyce Reel, MD Bettyann Ley, MD   [place] [address] [phone]  [place] [address] [facility] [phone]  MD Ximena Gray MD Dorthey Bulls, MD Nilda Hug, MD   [facility] [phone]  [facility] PEGGY BARNES, MD Tatianna Gracia, NP [address] [phone]  MD Romelia Dotson MD Shaune Mantel, MD Darby Ape, MD Maris Del, MD Ilsa Banks, MD Verlee Shelter, MD   [facility] [phone]  Anselmo Cameron MD [facility] [phone]  MD Jae Blanc, NP  MD Zaire Munoz Mai, MD Arnulfo Kennedy, MD Mason Fox, MD Edwina Oz, MD   [facility] [address] MD Rufina Galarza, FNP  Nancie Snow, MD Jane Naylor, MD Nakul Ruggiero, MD Jennifer Ulrich MD [facility] [phone]  MD Katty Betancourt MD Levern Sleet, MD Alban Casillas, MD Opal Palmer MD   [facility] [phone]  MD Hernan Zavala MD [place] [phone]  MD Jenna Juarez, MD Gold Veliz, MD   [facility] [phone]  MD Bryanna Clements, MD Gilda Disla, MD Emeline Sandifer, MD Elijah Pruitt, MD Bobby Marrufo, RABIA Cross MD [address]  66   [phone]  MD Sadi Trammell MD Veronda Banister, MD   [address] [facility] [phone]  Sumeet Morris, MD Adrianna Cisse, FNP  Joyce Cordova, PASTANLEY Cordova, FNP  Abby Sandoval, PAMD Tez Sales, RABIA Skinner, DO Miscellaneous:  Kelly Breen [phone]

## 2023-02-08 LAB
BACTERIA SPEC CULT: NORMAL
SERVICE CMNT-IMP: NORMAL

## 2023-02-09 ENCOUNTER — HOSPITAL ENCOUNTER (EMERGENCY)
Age: 28
Discharge: HOME | End: 2023-02-10
Attending: EMERGENCY MEDICINE

## 2023-02-09 DIAGNOSIS — K52.1 ANTIBIOTIC-ASSOCIATED DIARRHEA: Primary | ICD-10-CM

## 2023-02-09 DIAGNOSIS — T36.95XA ANTIBIOTIC-ASSOCIATED DIARRHEA: Primary | ICD-10-CM

## 2023-02-09 DIAGNOSIS — N50.812 PAIN IN BOTH TESTICLES: ICD-10-CM

## 2023-02-09 DIAGNOSIS — N50.811 PAIN IN BOTH TESTICLES: ICD-10-CM

## 2023-02-09 DIAGNOSIS — M54.50 ACUTE BILATERAL LOW BACK PAIN WITHOUT SCIATICA: ICD-10-CM

## 2023-02-09 LAB
APPEARANCE UR: CLEAR
BACTERIA URNS QL MICRO: NEGATIVE /HPF
BASOPHILS # BLD: 0 K/UL (ref 0–0.1)
BASOPHILS NFR BLD: 0 % (ref 0–1)
BILIRUB UR QL: NEGATIVE
COLOR UR: NORMAL
DIFFERENTIAL METHOD BLD: ABNORMAL
EOSINOPHIL # BLD: 0.2 K/UL (ref 0–0.4)
EOSINOPHIL NFR BLD: 2 % (ref 0–7)
EPITH CASTS URNS QL MICRO: NORMAL /LPF
ERYTHROCYTE [DISTWIDTH] IN BLOOD BY AUTOMATED COUNT: 11.2 % (ref 11.5–14.5)
GLUCOSE UR STRIP.AUTO-MCNC: NEGATIVE MG/DL
HCT VFR BLD AUTO: 40.8 % (ref 36.6–50.3)
HGB BLD-MCNC: 14.8 G/DL (ref 12.1–17)
HGB UR QL STRIP: NEGATIVE
IMM GRANULOCYTES # BLD AUTO: 0.1 K/UL (ref 0–0.04)
IMM GRANULOCYTES NFR BLD AUTO: 1 % (ref 0–0.5)
KETONES UR QL STRIP.AUTO: NEGATIVE MG/DL
LEUKOCYTE ESTERASE UR QL STRIP.AUTO: NEGATIVE
LYMPHOCYTES # BLD: 2.3 K/UL (ref 0.8–3.5)
LYMPHOCYTES NFR BLD: 23 % (ref 12–49)
MCH RBC QN AUTO: 30.8 PG (ref 26–34)
MCHC RBC AUTO-ENTMCNC: 36.3 G/DL (ref 30–36.5)
MCV RBC AUTO: 85 FL (ref 80–99)
MONOCYTES # BLD: 1 K/UL (ref 0–1)
MONOCYTES NFR BLD: 10 % (ref 5–13)
NEUTS SEG # BLD: 6.6 K/UL (ref 1.8–8)
NEUTS SEG NFR BLD: 64 % (ref 32–75)
NITRITE UR QL STRIP.AUTO: NEGATIVE
NRBC # BLD: 0 K/UL (ref 0–0.01)
NRBC BLD-RTO: 0 PER 100 WBC
PH UR STRIP: 7.5 (ref 5–8)
PLATELET # BLD AUTO: 166 K/UL (ref 150–400)
PMV BLD AUTO: 11.8 FL (ref 8.9–12.9)
PROT UR STRIP-MCNC: NEGATIVE MG/DL
RBC # BLD AUTO: 4.8 M/UL (ref 4.1–5.7)
RBC #/AREA URNS HPF: NORMAL /HPF (ref 0–5)
SP GR UR REFRACTOMETRY: 1.01 (ref 1–1.03)
UR CULT HOLD, URHOLD: NORMAL
UROBILINOGEN UR QL STRIP.AUTO: 0.2 EU/DL (ref 0.2–1)
WBC # BLD AUTO: 10.1 K/UL (ref 4.1–11.1)
WBC URNS QL MICRO: NORMAL /HPF (ref 0–4)

## 2023-02-09 PROCEDURE — 36415 COLL VENOUS BLD VENIPUNCTURE: CPT

## 2023-02-09 PROCEDURE — 96374 THER/PROPH/DIAG INJ IV PUSH: CPT

## 2023-02-09 PROCEDURE — 99284 EMERGENCY DEPT VISIT MOD MDM: CPT

## 2023-02-09 PROCEDURE — 85025 COMPLETE CBC W/AUTO DIFF WBC: CPT

## 2023-02-09 PROCEDURE — 74011250636 HC RX REV CODE- 250/636: Performed by: EMERGENCY MEDICINE

## 2023-02-09 PROCEDURE — 81001 URINALYSIS AUTO W/SCOPE: CPT

## 2023-02-09 PROCEDURE — 80053 COMPREHEN METABOLIC PANEL: CPT

## 2023-02-09 RX ORDER — KETOROLAC TROMETHAMINE 30 MG/ML
15 INJECTION, SOLUTION INTRAMUSCULAR; INTRAVENOUS
Status: COMPLETED | OUTPATIENT
Start: 2023-02-10 | End: 2023-02-09

## 2023-02-09 RX ADMIN — KETOROLAC TROMETHAMINE 15 MG: 30 INJECTION, SOLUTION INTRAMUSCULAR; INTRAVENOUS at 23:55

## 2023-02-10 ENCOUNTER — APPOINTMENT (OUTPATIENT)
Dept: CT IMAGING | Age: 28
End: 2023-02-10
Attending: EMERGENCY MEDICINE

## 2023-02-10 VITALS
HEART RATE: 92 BPM | BODY MASS INDEX: 30.87 KG/M2 | HEIGHT: 70 IN | SYSTOLIC BLOOD PRESSURE: 142 MMHG | TEMPERATURE: 98.3 F | OXYGEN SATURATION: 95 % | WEIGHT: 215.61 LBS | RESPIRATION RATE: 16 BRPM | DIASTOLIC BLOOD PRESSURE: 98 MMHG

## 2023-02-10 LAB
ALBUMIN SERPL-MCNC: 4.1 G/DL (ref 3.5–5)
ALBUMIN/GLOB SERPL: 1.3 (ref 1.1–2.2)
ALP SERPL-CCNC: 63 U/L (ref 45–117)
ALT SERPL-CCNC: 31 U/L (ref 12–78)
ANION GAP SERPL CALC-SCNC: 9 MMOL/L (ref 5–15)
AST SERPL-CCNC: 18 U/L (ref 15–37)
BILIRUB SERPL-MCNC: 1.7 MG/DL (ref 0.2–1)
BUN SERPL-MCNC: 15 MG/DL (ref 6–20)
BUN/CREAT SERPL: 8 (ref 12–20)
CALCIUM SERPL-MCNC: 9.3 MG/DL (ref 8.5–10.1)
CHLORIDE SERPL-SCNC: 101 MMOL/L (ref 97–108)
CO2 SERPL-SCNC: 30 MMOL/L (ref 21–32)
CREAT SERPL-MCNC: 1.79 MG/DL (ref 0.7–1.3)
GLOBULIN SER CALC-MCNC: 3.2 G/DL (ref 2–4)
GLUCOSE SERPL-MCNC: 98 MG/DL (ref 65–100)
POTASSIUM SERPL-SCNC: 3.8 MMOL/L (ref 3.5–5.1)
PROT SERPL-MCNC: 7.3 G/DL (ref 6.4–8.2)
SODIUM SERPL-SCNC: 140 MMOL/L (ref 136–145)

## 2023-02-10 PROCEDURE — 74011250636 HC RX REV CODE- 250/636: Performed by: EMERGENCY MEDICINE

## 2023-02-10 PROCEDURE — 74176 CT ABD & PELVIS W/O CONTRAST: CPT

## 2023-02-10 RX ORDER — LACTOBACILLUS RHAMNOSUS GG 10B CELL
1 CAPSULE ORAL DAILY
Qty: 14 CAPSULE | Refills: 0 | Status: SHIPPED | OUTPATIENT
Start: 2023-02-10

## 2023-02-10 RX ORDER — ACETAMINOPHEN 500 MG
1000 TABLET ORAL
Qty: 20 TABLET | Refills: 0 | Status: SHIPPED | OUTPATIENT
Start: 2023-02-10

## 2023-02-10 RX ORDER — BISMUTH SUBSALICYLATE 262 MG/15ML
30 LIQUID ORAL
Qty: 354 ML | Refills: 0 | Status: SHIPPED | OUTPATIENT
Start: 2023-02-10

## 2023-02-10 RX ADMIN — SODIUM CHLORIDE 1000 ML: 9 INJECTION, SOLUTION INTRAVENOUS at 00:20

## 2023-02-10 NOTE — ED TRIAGE NOTES
Triage note:2 weeks of sore throat started antibiotics Monday 3 days of low back pain and bilateral testicle pain with nausea and vomiting and diarrhea.

## 2023-02-11 NOTE — ED PROVIDER NOTES
The history is provided by the patient. Abdominal Pain   This is a new problem. The current episode started 2 days ago. The problem occurs constantly. The problem has not changed since onset. The pain is located in the generalized abdominal region. Associated symptoms include diarrhea, testicular pain and back pain. Exacerbated by: starting antibiotics. The pain is relieved by Nothing. Testicle Pain  This is a new problem. The current episode started 12 to 24 hours ago. The problem occurs constantly. The problem has not changed since onset. Associated symptoms include abdominal pain and diarrhea. Back Pain   This is a recurrent problem. The current episode started more than 2 days ago. The problem has not changed since onset. The problem occurs constantly. The pain is associated with no known injury. The pain is present in the lower back, left side and right side. Radiates to: groin and testicles. Associated symptoms include abdominal pain. He has tried nothing for the symptoms. History reviewed. No pertinent past medical history. History reviewed. No pertinent surgical history. History reviewed. No pertinent family history.     Social History     Socioeconomic History    Marital status: SINGLE     Spouse name: Not on file    Number of children: Not on file    Years of education: Not on file    Highest education level: Not on file   Occupational History    Not on file   Tobacco Use    Smoking status: Every Day     Packs/day: 1.00     Types: Cigarettes    Smokeless tobacco: Never   Substance and Sexual Activity    Alcohol use: Yes     Comment: occasional    Drug use: Yes     Types: Marijuana    Sexual activity: Not on file   Other Topics Concern    Not on file   Social History Narrative    Not on file     Social Determinants of Health     Financial Resource Strain: Not on file   Food Insecurity: Not on file   Transportation Needs: Not on file   Physical Activity: Not on file   Stress: Not on file Social Connections: Not on file   Intimate Partner Violence: Not on file   Housing Stability: Not on file         ALLERGIES: Penicillins    Review of Systems   Gastrointestinal:  Positive for abdominal pain and diarrhea. Genitourinary:  Positive for testicular pain. Musculoskeletal:  Positive for back pain. Vitals:    02/09/23 2329 02/09/23 2334 02/10/23 0029   BP: (!) 142/98 (!) 142/98    Pulse:  92    Resp:  16    Temp: 98.3 °F (36.8 °C)     SpO2: 93%  95%   Weight:  97.8 kg (215 lb 9.8 oz)    Height:  5' 10\" (1.778 m)             Physical Exam  Vitals and nursing note reviewed. Constitutional:       General: He is not in acute distress. Appearance: He is well-developed. HENT:      Head: Normocephalic and atraumatic. Eyes:      Conjunctiva/sclera: Conjunctivae normal.   Neck:      Trachea: No tracheal deviation. Cardiovascular:      Rate and Rhythm: Normal rate and regular rhythm. Pulmonary:      Effort: Pulmonary effort is normal. No respiratory distress. Abdominal:      General: There is no distension. Tenderness: There is no abdominal tenderness. There is right CVA tenderness and left CVA tenderness. Genitourinary:     Penis: Normal.       Testes: Normal.         Right: Tenderness or swelling not present. Left: Tenderness or swelling not present. Musculoskeletal:         General: No deformity. Normal range of motion. Cervical back: Neck supple. Lumbar back: Tenderness present. No bony tenderness. Back:    Skin:     General: Skin is warm and dry. Neurological:      Mental Status: He is alert. Cranial Nerves: No cranial nerve deficit. Psychiatric:         Behavior: Behavior normal.        Medical Decision Making  69-year-old male presents with diarrhea and backslash abdominal pain with pain radiating into testicles that started after taking antibiotics for sinus infection diagnosed by CT as well as pharyngitis.   He had a negative COVID screening and negative strep screening on a previous visit. CT of the abdomen and pelvis was performed to rule out kidney stone or uropathy and none was identified. He was hydrated for losses. Urine shows no signs of infection. He may be experiencing a viral syndrome which is causing myalgias and we discussed supportive care and probiotics to reestablish gut jose luis. He has no clinical evidence of torsion but atypical features and bilateral testicular discomfort could be related to the viral process as well. At this point I feel it is safe for him to discontinue antibiotics as the adverse effects appear to outweigh potential benefit. Patient needs to establish primary care in the area and was provided contact information to do so. Return precautions discussed for worsening or new concerning symptoms. Problems Addressed:  Acute bilateral low back pain without sciatica: acute illness or injury  Antibiotic-associated diarrhea: acute illness or injury  Pain in both testicles: acute illness or injury    Amount and/or Complexity of Data Reviewed  Independent Historian: spouse  Labs: ordered. Decision-making details documented in ED Course. Radiology: ordered and independent interpretation performed. Decision-making details documented in ED Course. Risk  OTC drugs. Prescription drug management.            Procedures

## 2023-05-18 NOTE — ED NOTES
Patient given discharge instructions. No questions or concerns at this time. Patient's VSS and in no acute distress. Patient ambulatory out of unit at discharge. [de-identified] : neck incision clear; J-P removed

## 2023-05-25 RX ORDER — ACETAMINOPHEN 500 MG
1000 TABLET ORAL EVERY 6 HOURS PRN
COMMUNITY
Start: 2023-02-10

## 2023-06-06 ENCOUNTER — HOSPITAL ENCOUNTER (EMERGENCY)
Facility: HOSPITAL | Age: 28
Discharge: LEFT BEFORE DISCHARGE | End: 2023-06-06
Attending: EMERGENCY MEDICINE

## 2023-06-06 ENCOUNTER — APPOINTMENT (OUTPATIENT)
Facility: HOSPITAL | Age: 28
End: 2023-06-06

## 2023-06-06 VITALS
RESPIRATION RATE: 18 BRPM | SYSTOLIC BLOOD PRESSURE: 137 MMHG | BODY MASS INDEX: 29.79 KG/M2 | DIASTOLIC BLOOD PRESSURE: 84 MMHG | WEIGHT: 208.11 LBS | TEMPERATURE: 97.9 F | HEART RATE: 73 BPM | HEIGHT: 70 IN | OXYGEN SATURATION: 98 %

## 2023-06-06 DIAGNOSIS — J02.9 ACUTE PHARYNGITIS, UNSPECIFIED ETIOLOGY: Primary | ICD-10-CM

## 2023-06-06 LAB — DEPRECATED S PYO AG THROAT QL EIA: NEGATIVE

## 2023-06-06 PROCEDURE — 99283 EMERGENCY DEPT VISIT LOW MDM: CPT

## 2023-06-06 PROCEDURE — 87070 CULTURE OTHR SPECIMN AEROBIC: CPT

## 2023-06-06 PROCEDURE — 87880 STREP A ASSAY W/OPTIC: CPT

## 2023-06-06 ASSESSMENT — PAIN DESCRIPTION - PAIN TYPE: TYPE: ACUTE PAIN

## 2023-06-06 ASSESSMENT — PAIN DESCRIPTION - LOCATION: LOCATION: THROAT

## 2023-06-06 ASSESSMENT — PAIN DESCRIPTION - DESCRIPTORS: DESCRIPTORS: THROBBING;STABBING

## 2023-06-06 ASSESSMENT — PAIN SCALES - GENERAL: PAINLEVEL_OUTOF10: 8

## 2023-06-06 ASSESSMENT — PAIN - FUNCTIONAL ASSESSMENT: PAIN_FUNCTIONAL_ASSESSMENT: 0-10

## 2023-06-06 NOTE — ED TRIAGE NOTES
Patient presents ambulatory to the ER with complaints of sore throat for a few days. States worse this morning and was difficult to breath. Breathing improved now after taking ibuprofen at home prior to arrival. Patient able to manage secretions.

## 2023-06-06 NOTE — ED TRIAGE NOTES
While registration was in room gathering information witnessed patient exit room with steady gait. Patient left department at that time. Patient out of the building before ED staff could make contact with patient. Per registration staff patient reported \"I'm done with you people. This is taking too long. \" Prior to leaving. MD made aware patient left.

## 2023-06-06 NOTE — ED PROVIDER NOTES
[facility]  EMERGENCY DEPARTMENT ENCOUNTER      Pt Name: Marian Argueta  MRN: [id number]  [place] 1995  Date of evaluation: 6/6/2023  Provider: Neva Weiss MD    CHIEF COMPLAINT       Chief Complaint   Patient presents with    Pharyngitis         HISTORY OF PRESENT ILLNESS   (Location/Symptom, Timing/Onset, Context/Setting, Quality, Duration, Modifying Factors, Severity)  Note limiting factors. The patient is a 49-year-old white male who presents the emergency room with a cute onset of swelling of his throat especially on the right side. He was not drooling though he said he had some hard time breathing. He was unable to go to work after this. He has had recurrent tonsillitis he states multiple times this year he denies fever chills. Review of External Medical Records:     Nursing Notes were reviewed. REVIEW OF SYSTEMS    (2-9 systems for level 4, 10 or more for level 5)     Review of Systems   All other systems reviewed and are negative. Except as noted above the remainder of the review of systems was reviewed and negative. PAST MEDICAL HISTORY   History reviewed. No pertinent past medical history. SURGICAL HISTORY     History reviewed. No pertinent surgical history. CURRENT MEDICATIONS       Previous Medications    ACETAMINOPHEN (TYLENOL) 500 MG TABLET    Take 2 tablets by mouth every 6 hours as needed    BISMUTH SUBSALICYLATE (PEPTO BISMOL) 262 MG/15ML SUSPENSION    Take 30 mLs by mouth every 4 hours as needed    LACTOBACILLUS RHAMNOSUS, GG, ( PROBIOTIC DIGESTIVE CARE) CAPS    Take 1 capsule by mouth daily       ALLERGIES     Penicillins    FAMILY HISTORY     History reviewed. No pertinent family history.        SOCIAL HISTORY       Social History     Socioeconomic History    Marital status: Single     Spouse name: None    Number of children: None    Years of education: None    Highest education level: None   Tobacco Use    Smoking status:

## 2023-06-08 LAB
BACTERIA SPEC CULT: NORMAL
SERVICE CMNT-IMP: NORMAL

## 2023-08-06 ENCOUNTER — HOSPITAL ENCOUNTER (EMERGENCY)
Facility: HOSPITAL | Age: 28
Discharge: HOME | End: 2023-08-06
Attending: STUDENT IN AN ORGANIZED HEALTH CARE EDUCATION/TRAINING PROGRAM

## 2023-08-06 ENCOUNTER — APPOINTMENT (OUTPATIENT)
Facility: HOSPITAL | Age: 28
End: 2023-08-06

## 2023-08-06 VITALS
HEART RATE: 83 BPM | BODY MASS INDEX: 30.85 KG/M2 | OXYGEN SATURATION: 97 % | WEIGHT: 215 LBS | DIASTOLIC BLOOD PRESSURE: 106 MMHG | SYSTOLIC BLOOD PRESSURE: 143 MMHG | RESPIRATION RATE: 16 BRPM | TEMPERATURE: 98.3 F

## 2023-08-06 DIAGNOSIS — S06.0XAA CONCUSSION WITH UNKNOWN LOSS OF CONSCIOUSNESS STATUS, INITIAL ENCOUNTER: Primary | ICD-10-CM

## 2023-08-06 DIAGNOSIS — S02.2XXA CLOSED FRACTURE OF NASAL BONE, INITIAL ENCOUNTER: ICD-10-CM

## 2023-08-06 DIAGNOSIS — S16.1XXA STRAIN OF NECK MUSCLE, INITIAL ENCOUNTER: ICD-10-CM

## 2023-08-06 DIAGNOSIS — S01.21XA LACERATION OF NOSE, INITIAL ENCOUNTER: ICD-10-CM

## 2023-08-06 PROCEDURE — 72125 CT NECK SPINE W/O DYE: CPT

## 2023-08-06 PROCEDURE — 2500000003 HC RX 250 WO HCPCS: Performed by: STUDENT IN AN ORGANIZED HEALTH CARE EDUCATION/TRAINING PROGRAM

## 2023-08-06 PROCEDURE — 70450 CT HEAD/BRAIN W/O DYE: CPT

## 2023-08-06 PROCEDURE — 99284 EMERGENCY DEPT VISIT MOD MDM: CPT

## 2023-08-06 PROCEDURE — 70486 CT MAXILLOFACIAL W/O DYE: CPT

## 2023-08-06 PROCEDURE — 6370000000 HC RX 637 (ALT 250 FOR IP): Performed by: STUDENT IN AN ORGANIZED HEALTH CARE EDUCATION/TRAINING PROGRAM

## 2023-08-06 RX ORDER — OXYCODONE HYDROCHLORIDE AND ACETAMINOPHEN 5; 325 MG/1; MG/1
1 TABLET ORAL EVERY 6 HOURS PRN
Qty: 8 TABLET | Refills: 0 | Status: SHIPPED | OUTPATIENT
Start: 2023-08-06 | End: 2023-08-09

## 2023-08-06 RX ORDER — CEPHALEXIN 250 MG/1
500 CAPSULE ORAL
Status: COMPLETED | OUTPATIENT
Start: 2023-08-06 | End: 2023-08-06

## 2023-08-06 RX ORDER — LIDOCAINE HYDROCHLORIDE AND EPINEPHRINE 10; 10 MG/ML; UG/ML
10 INJECTION, SOLUTION INFILTRATION; PERINEURAL
Status: COMPLETED | OUTPATIENT
Start: 2023-08-06 | End: 2023-08-06

## 2023-08-06 RX ORDER — OXYCODONE HYDROCHLORIDE AND ACETAMINOPHEN 5; 325 MG/1; MG/1
1 TABLET ORAL
Status: COMPLETED | OUTPATIENT
Start: 2023-08-06 | End: 2023-08-06

## 2023-08-06 RX ORDER — CEPHALEXIN 500 MG/1
500 CAPSULE ORAL 3 TIMES DAILY
Qty: 15 CAPSULE | Refills: 0 | Status: SHIPPED | OUTPATIENT
Start: 2023-08-06 | End: 2023-08-11

## 2023-08-06 RX ADMIN — OXYCODONE HYDROCHLORIDE AND ACETAMINOPHEN 1 TABLET: 5; 325 TABLET ORAL at 20:51

## 2023-08-06 RX ADMIN — LIDOCAINE HYDROCHLORIDE,EPINEPHRINE BITARTRATE 10 ML: 10; .01 INJECTION, SOLUTION INFILTRATION; PERINEURAL at 20:51

## 2023-08-06 RX ADMIN — CEPHALEXIN 500 MG: 250 CAPSULE ORAL at 22:00

## 2023-08-06 ASSESSMENT — LIFESTYLE VARIABLES
HOW MANY STANDARD DRINKS CONTAINING ALCOHOL DO YOU HAVE ON A TYPICAL DAY: 1 OR 2
HOW OFTEN DO YOU HAVE A DRINK CONTAINING ALCOHOL: MONTHLY OR LESS

## 2023-08-06 ASSESSMENT — ENCOUNTER SYMPTOMS: SHORTNESS OF BREATH: 0
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
## 2023-08-07 NOTE — ED NOTES
Pt discharged in stable condition at this time. MD/GAVIN reviewed discharge instructions, prescriptions, and follow up with patient at bedside. Pt verbalized understanding and denies any needs or questions at this time.    Pt NAD on DC ambulatory with his family who will take him home     Lary Coley RN  08/06/23 4740

## 2023-11-09 ENCOUNTER — APPOINTMENT (OUTPATIENT)
Facility: HOSPITAL | Age: 28
End: 2023-11-09

## 2023-11-09 ENCOUNTER — HOSPITAL ENCOUNTER (EMERGENCY)
Facility: HOSPITAL | Age: 28
Discharge: HOME | End: 2023-11-09
Attending: STUDENT IN AN ORGANIZED HEALTH CARE EDUCATION/TRAINING PROGRAM

## 2023-11-09 VITALS
RESPIRATION RATE: 18 BRPM | HEIGHT: 70 IN | HEART RATE: 82 BPM | WEIGHT: 207.67 LBS | BODY MASS INDEX: 29.73 KG/M2 | OXYGEN SATURATION: 100 % | SYSTOLIC BLOOD PRESSURE: 146 MMHG | DIASTOLIC BLOOD PRESSURE: 88 MMHG | TEMPERATURE: 97.6 F
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
## 2023-11-09 DIAGNOSIS — M25.572 ACUTE LEFT ANKLE PAIN: ICD-10-CM

## 2023-11-09 DIAGNOSIS — M79.662 PAIN OF LEFT LOWER LEG: ICD-10-CM

## 2023-11-09 DIAGNOSIS — M25.562 ACUTE PAIN OF LEFT KNEE: ICD-10-CM

## 2023-11-09 PROCEDURE — 96372 THER/PROPH/DIAG INJ SC/IM: CPT

## 2023-11-09 PROCEDURE — 99284 EMERGENCY DEPT VISIT MOD MDM: CPT

## 2023-11-09 PROCEDURE — 73600 X-RAY EXAM OF ANKLE: CPT

## 2023-11-09 PROCEDURE — 73560 X-RAY EXAM OF KNEE 1 OR 2: CPT

## 2023-11-09 PROCEDURE — 73590 X-RAY EXAM OF LOWER LEG: CPT

## 2023-11-09 PROCEDURE — 6360000002 HC RX W HCPCS: Performed by: NURSE PRACTITIONER

## 2023-11-09 RX ORDER — KETOROLAC TROMETHAMINE 30 MG/ML
30 INJECTION, SOLUTION INTRAMUSCULAR; INTRAVENOUS
Status: COMPLETED | OUTPATIENT
Start: 2023-11-09 | End: 2023-11-09

## 2023-11-09 RX ORDER — IBUPROFEN 600 MG/1
600 TABLET ORAL 4 TIMES DAILY PRN
Qty: 120 TABLET | Refills: 0 | Status: SHIPPED | OUTPATIENT
Start: 2023-11-09

## 2023-11-09 RX ADMIN — KETOROLAC TROMETHAMINE 30 MG: 30 INJECTION, SOLUTION INTRAMUSCULAR; INTRAVENOUS at 14:19

## 2023-11-09 ASSESSMENT — PAIN DESCRIPTION - LOCATION: LOCATION: LEG

## 2023-11-09 ASSESSMENT — PAIN DESCRIPTION - DESCRIPTORS: DESCRIPTORS: ACHING

## 2023-11-09 ASSESSMENT — ENCOUNTER SYMPTOMS
RESPIRATORY NEGATIVE: 1
GASTROINTESTINAL NEGATIVE: 1

## 2023-11-09 ASSESSMENT — PAIN DESCRIPTION - ORIENTATION: ORIENTATION: LEFT

## 2023-11-09 ASSESSMENT — PAIN SCALES - GENERAL: PAINLEVEL_OUTOF10: 6

## 2023-11-09 ASSESSMENT — PAIN - FUNCTIONAL ASSESSMENT: PAIN_FUNCTIONAL_ASSESSMENT: 0-10

## 2023-11-09 NOTE — ED NOTES
Patient left ED in no acute distress, alert and oriented x4. Patient was encouraged to come back if symptoms get worse. Patient was provided with discharge instructions and prescriptions. All questions were answered. Patient left ambulatory.          Geovanna Johansen [place]  23/34/25 [id number]
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
## 2024-10-10 ENCOUNTER — HOSPITAL ENCOUNTER (EMERGENCY)
Facility: HOSPITAL | Age: 29
Discharge: HOME | End: 2024-10-10
Attending: EMERGENCY MEDICINE

## 2024-10-10 ENCOUNTER — APPOINTMENT (OUTPATIENT)
Facility: HOSPITAL | Age: 29
End: 2024-10-10

## 2024-10-10 VITALS
DIASTOLIC BLOOD PRESSURE: 77 MMHG | TEMPERATURE: 99.5 F | RESPIRATION RATE: 18 BRPM | HEIGHT: 71 IN | SYSTOLIC BLOOD PRESSURE: 137 MMHG | WEIGHT: 200.4 LBS | HEART RATE: 62 BPM | OXYGEN SATURATION: 95 % | BODY MASS INDEX: 28.06 KG/M2

## 2024-10-10 DIAGNOSIS — B34.9 VIRAL ILLNESS: Primary | ICD-10-CM

## 2024-10-10 LAB
FLUAV RNA SPEC QL NAA+PROBE: NOT DETECTED
FLUBV RNA SPEC QL NAA+PROBE: NOT DETECTED
SARS-COV-2 RNA RESP QL NAA+PROBE: NOT DETECTED
SOURCE: NORMAL

## 2024-10-10 PROCEDURE — 6360000002 HC RX W HCPCS: Performed by: EMERGENCY MEDICINE

## 2024-10-10 PROCEDURE — 96372 THER/PROPH/DIAG INJ SC/IM: CPT

## 2024-10-10 PROCEDURE — 99284 EMERGENCY DEPT VISIT MOD MDM: CPT

## 2024-10-10 PROCEDURE — 71046 X-RAY EXAM CHEST 2 VIEWS: CPT

## 2024-10-10 PROCEDURE — 87636 SARSCOV2 & INF A&B AMP PRB: CPT

## 2024-10-10 RX ORDER — KETOROLAC TROMETHAMINE 30 MG/ML
30 INJECTION, SOLUTION INTRAMUSCULAR; INTRAVENOUS ONCE
Status: COMPLETED | OUTPATIENT
Start: 2024-10-10 | End: 2024-10-10

## 2024-10-10 RX ADMIN — KETOROLAC TROMETHAMINE 30 MG: 30 INJECTION, SOLUTION INTRAMUSCULAR at 10:03

## 2024-10-10 ASSESSMENT — ENCOUNTER SYMPTOMS
COUGH: 1
SHORTNESS OF BREATH: 0
ABDOMINAL PAIN: 0
VOMITING: 0

## 2024-10-10 ASSESSMENT — PAIN SCALES - GENERAL: PAINLEVEL_OUTOF10: 8

## 2024-10-10 ASSESSMENT — PAIN DESCRIPTION - LOCATION: LOCATION: GENERALIZED

## 2024-10-10 ASSESSMENT — PAIN DESCRIPTION - DESCRIPTORS: DESCRIPTORS: ACHING

## 2024-10-10 NOTE — ED PROVIDER NOTES
person, place, and time.   Psychiatric:         Mood and Affect: Mood normal.         DIAGNOSTIC RESULTS       XR CHEST (2 VW)   Final Result      No acute process. Limited by low lung volumes.         Electronically signed by Cruz Kiser          Labs Reviewed   COVID-19 & INFLUENZA COMBO           EMERGENCY DEPARTMENT COURSE and DIFFERENTIAL DIAGNOSIS/MDM:   Vitals:    Vitals:    10/10/24 0940   BP: 133/84   Pulse: 99   Resp: 18   Temp: 99.5 °F (37.5 °C)   TempSrc: Tympanic   SpO2: 96%   Weight: 90.9 kg (200 lb 6.4 oz)   Height: 1.791 m (5' 10.5\")         Medical Decision Making  28-year-old male with no significant past medical history presents with complaints of 2 days cough with bodyaches and today with blood-tinged sputum.  Patient is afebrile, well-appearing, nontoxic, hemodynamically stable, no respiratory distress, clear to auscultation bilaterally, normal room oxygen saturation.  No clinical suspicion for PE.  PERC negative.  Plan-COVID test/flu test, chest x-ray.      Amount and/or Complexity of Data Reviewed  Radiology: ordered.    Risk  Prescription drug management.            REASSESSMENT     ED Course as of 10/10/24 1126   Thu Oct 10, 2024   1052 Xray NAD as independently interpreted by me. Neena Javed MD   [LA]   1123 Patient reports feeling somewhat improved after Toradol and p.o. hydration. [LA]      ED Course User Index  [LA] Neena Javed MD     11:28 AM  Patient's results have been reviewed with them.  Patient and/or family have verbally conveyed their understanding and agreement of the patient's signs, symptoms, diagnosis, treatment and prognosis and additionally agree to follow up as recommended or return to the Emergency Room should their condition change prior to follow-up.  Discharge instructions have also been provided to the patient with some educational information regarding their diagnosis as well a list of reasons why they would want to return to the ER prior to their follow-up

## 2024-10-10 NOTE — ED TRIAGE NOTES
ED triage note: ambulatory with a steady gait. Reports started coughing 2 days ago and then this morning woke up with body aches and coughing blood.